# Patient Record
Sex: FEMALE | Race: WHITE | URBAN - METROPOLITAN AREA
[De-identification: names, ages, dates, MRNs, and addresses within clinical notes are randomized per-mention and may not be internally consistent; named-entity substitution may affect disease eponyms.]

---

## 2017-09-05 ENCOUNTER — HISTORICAL (OUTPATIENT)
Dept: ADMINISTRATIVE | Facility: HOSPITAL | Age: 77
End: 2017-09-05

## 2021-07-29 ENCOUNTER — HISTORICAL (OUTPATIENT)
Dept: ADMINISTRATIVE | Facility: HOSPITAL | Age: 81
End: 2021-07-29

## 2021-09-09 ENCOUNTER — HISTORICAL (OUTPATIENT)
Dept: ADMINISTRATIVE | Facility: HOSPITAL | Age: 81
End: 2021-09-09

## 2022-04-09 ENCOUNTER — HISTORICAL (OUTPATIENT)
Dept: ADMINISTRATIVE | Facility: HOSPITAL | Age: 82
End: 2022-04-09

## 2022-04-27 VITALS — HEIGHT: 64 IN | WEIGHT: 122 LBS | BODY MASS INDEX: 20.83 KG/M2

## 2022-04-30 NOTE — OP NOTE
Patient:   Brooke Dalal             MRN: [id number]            FIN: [id number]               Age:   80 years     Sex:  Female     :  1940   Associated Diagnoses:   None   Author:   Dipak Nash MD      Phacoemulsification of Cataract with Intraocular Implant    Preoperative Diagnosis: Cataract Right Eye     Postoperative Diagnosis: Cataract Right Eye     Surgeon: Dipak Nash MD    Assistant: JEREMY Calero    Anestheisa: MAC    Complications: None    After the patient underwent topical anesthesia along with IV sedation in the holding area, the patient was brought to the [facility] laser.  The patient was marked at 0 & 180 degrees.  A time out was performed.  The capsulotomy, lens fragmentation & LRI were completed.  Then the patient was brought to the operating suite.  The patient was prepped and draped in a sterile fashion.  A pediatric tegaderm and lid speculum were used to retract the upper and lower lashes and lids.  A 1.0mm paracentesis was then made at the (_12_) o'clock position.  Intraocular non-preserved 1% Xylocaine (4% diluted down to 1%) was irrigated into the anterior chamber.  Endocoat was injected into the eye.  A clear corneal incision was made with a 2.4mm Keratome blade.  BSS was used to hydro dissect the nucleus from the capsule.  The nucleus was then phacoemulsified with the Abbott machine for a EFX of (_9_).  The cortex was then removed with the I/A hand piece and Helon was placed into the posterior bag of the eye.  A posterior chamber implant (_ZXR00__) of power (__21.0_) was placed in the capsular bag.  The Helon was then removed from the eye with the I/A hand piece.  The anterior chamber was inflated with BSS and the wound was checked for leaks.  The lid speculum was removed and a drop of Ofloxacin 0.3% was placed in the operative eye.  The patient was brought to the recovery suite in stable condition.

## 2022-04-30 NOTE — OP NOTE
Patient:   Brooke Dalal             MRN: [id number]            FIN: [id number]               Age:   80 years     Sex:  Female     :  1940   Associated Diagnoses:   None   Author:   Dipak Nash MD      Phacoemulsification of Cataract with Toric Intraocular Implant    Preoperative Diagnosis: Cataract  Left Eye    Postoperative Diagnosis: Cataract  Left Eye    Surgeon: Dipak Nash MD    Assistant: JEREMY Calero    Anestheisa: MAC    Complications: None    After the patient underwent topical anesthesia along with IV sedation in the holding area, the patient was brought to the \[facility]\"" laser.  The patient was marked at 0 & 180 degrees.  A time out was performed.  The capsulotomy, lens fragmentation were completed.  Then the patient was brought to the operating suite.  The patient was then prepped and draped in a sterile fashion.  A pediatric tegaderm and a lid speculum were used to retract the upper and lower lashes and lids.  A 1.0mm paracentesis was then made at the (_5 & 11_) o'clock position.  Intraocular non-preserved 1% Xylocaine (4% diluted down to 1%) was irrigated into the anterior chamber.  Endocoat was injected into the eye.  A clear corneal incision was made with a 2.4mm Keratome blade.  BSS was used to hydro dissect the nucleus from the capsule.  The nucleus was then phacoemulsified with the Abbott machine for a EFX of (_7_).  The cortex was then removed with a I/A hand piece and Helon was placed into the posterior bag of the eye.  A posterior chamber implant (_[id number]__) of power (__21.5_) was placed in the capsular bag @ (_140__) degrees.  The Helon was then removed from the eye with the I/A hand piece.  The anterior chamber was inflated with BSS and the wound was checked fro leaks.  The lid speculum was removed and a drop of Ofloxacin 0.3% was placed in the operative eye.  The patient was brought to the surgery suite in stable condition.